# Patient Record
Sex: MALE | Race: WHITE | Employment: OTHER | URBAN - METROPOLITAN AREA
[De-identification: names, ages, dates, MRNs, and addresses within clinical notes are randomized per-mention and may not be internally consistent; named-entity substitution may affect disease eponyms.]

---

## 2017-04-05 ENCOUNTER — HOSPITAL ENCOUNTER (OUTPATIENT)
Dept: GENERAL RADIOLOGY | Age: 77
Discharge: HOME | End: 2017-04-05
Attending: FAMILY MEDICINE
Payer: MEDICARE

## 2017-04-05 DIAGNOSIS — R05.9 COUGH: ICD-10-CM

## 2017-04-05 PROCEDURE — 71020 XR CHEST PA LAT: CPT

## 2019-04-16 ENCOUNTER — HOSPITAL ENCOUNTER (OUTPATIENT)
Dept: GENERAL RADIOLOGY | Age: 79
Discharge: HOME | End: 2019-04-16
Attending: FAMILY MEDICINE
Payer: MEDICARE

## 2019-04-16 DIAGNOSIS — M79.10 MUSCLE ACHE: ICD-10-CM

## 2019-04-16 DIAGNOSIS — R50.9 FEVER, UNSPECIFIED FEVER CAUSE: ICD-10-CM

## 2019-04-16 PROCEDURE — 71046 X-RAY EXAM CHEST 2 VIEWS: CPT

## 2020-09-02 PROBLEM — F32.0 CURRENT MILD EPISODE OF MAJOR DEPRESSIVE DISORDER WITHOUT PRIOR EPISODE (HCC): Status: ACTIVE | Noted: 2020-09-02

## 2020-09-02 PROBLEM — I48.20 CHRONIC ATRIAL FIBRILLATION (HCC): Status: ACTIVE | Noted: 2020-09-02

## 2021-08-10 PROBLEM — D68.69 SECONDARY HYPERCOAGULABLE STATE (HCC): Status: ACTIVE | Noted: 2021-08-10

## 2022-03-18 PROBLEM — I48.20 CHRONIC ATRIAL FIBRILLATION (HCC): Status: ACTIVE | Noted: 2020-09-02

## 2022-03-18 PROBLEM — D68.69 SECONDARY HYPERCOAGULABLE STATE (HCC): Status: ACTIVE | Noted: 2021-08-10

## 2022-03-19 PROBLEM — F32.0 CURRENT MILD EPISODE OF MAJOR DEPRESSIVE DISORDER WITHOUT PRIOR EPISODE (HCC): Status: ACTIVE | Noted: 2020-09-02

## 2022-06-14 ENCOUNTER — HOSPITAL ENCOUNTER (OUTPATIENT)
Dept: GENERAL RADIOLOGY | Age: 82
Discharge: HOME | End: 2022-06-14
Attending: FAMILY MEDICINE
Payer: MEDICARE

## 2022-06-14 DIAGNOSIS — R05.9 COUGH: ICD-10-CM

## 2022-06-14 PROCEDURE — 71046 X-RAY EXAM CHEST 2 VIEWS: CPT

## 2022-06-19 PROBLEM — D69.2 SENILE PURPURA (HCC): Status: ACTIVE | Noted: 2022-06-19

## 2022-07-18 PROBLEM — F32.0 CURRENT MILD EPISODE OF MAJOR DEPRESSIVE DISORDER WITHOUT PRIOR EPISODE (HCC): Status: RESOLVED | Noted: 2020-09-02 | Resolved: 2022-07-18

## 2023-08-24 ENCOUNTER — HOSPITAL ENCOUNTER (EMERGENCY)
Facility: HOSPITAL | Age: 83
Discharge: HOME | End: 2023-08-24
Attending: EMERGENCY MEDICINE

## 2023-08-24 VITALS
BODY MASS INDEX: 25.85 KG/M2 | HEART RATE: 83 BPM | DIASTOLIC BLOOD PRESSURE: 90 MMHG | HEIGHT: 70 IN | TEMPERATURE: 98 F | OXYGEN SATURATION: 95 % | RESPIRATION RATE: 16 BRPM | WEIGHT: 180.56 LBS | SYSTOLIC BLOOD PRESSURE: 175 MMHG

## 2023-08-24 DIAGNOSIS — G51.0 BELL'S PALSY: Primary | ICD-10-CM

## 2023-08-24 LAB
ANION GAP SERPL CALC-SCNC: 4 MMOL/L (ref 5–15)
BUN SERPL-MCNC: 12 MG/DL (ref 6–20)
BUN/CREAT SERPL: 11 (ref 12–20)
CALCIUM SERPL-MCNC: 9 MG/DL (ref 8.5–10.1)
CHLORIDE SERPL-SCNC: 110 MMOL/L (ref 97–108)
CO2 SERPL-SCNC: 28 MMOL/L (ref 21–32)
COMMENT:: NORMAL
CREAT SERPL-MCNC: 1.08 MG/DL (ref 0.7–1.3)
GLUCOSE SERPL-MCNC: 102 MG/DL (ref 65–100)
POTASSIUM SERPL-SCNC: 3.9 MMOL/L (ref 3.5–5.1)
SODIUM SERPL-SCNC: 142 MMOL/L (ref 136–145)
SPECIMEN HOLD: NORMAL

## 2023-08-24 PROCEDURE — 99283 EMERGENCY DEPT VISIT LOW MDM: CPT

## 2023-08-24 PROCEDURE — 80048 BASIC METABOLIC PNL TOTAL CA: CPT

## 2023-08-24 PROCEDURE — 36415 COLL VENOUS BLD VENIPUNCTURE: CPT

## 2023-08-24 RX ORDER — VALACYCLOVIR HYDROCHLORIDE 1 G/1
1000 TABLET, FILM COATED ORAL 3 TIMES DAILY
Qty: 21 TABLET | Refills: 0 | Status: SHIPPED | OUTPATIENT
Start: 2023-08-24 | End: 2023-08-31

## 2023-08-24 RX ORDER — PREDNISONE 10 MG/1
50 TABLET ORAL DAILY
Qty: 35 TABLET | Refills: 0 | Status: SHIPPED | OUTPATIENT
Start: 2023-08-24 | End: 2023-08-31

## 2023-08-24 ASSESSMENT — PAIN - FUNCTIONAL ASSESSMENT: PAIN_FUNCTIONAL_ASSESSMENT: NONE - DENIES PAIN

## 2023-08-24 NOTE — ED NOTES
Discharge instructions reviewed with pt.  Pt verbalized understanding      Ronaldo Valentino RN  08/24/23 1939

## 2023-08-24 NOTE — DISCHARGE INSTRUCTIONS
Return to the ER with any new or worsening symptoms. In the meantime take the medication as directed. I recommend you use artificial tears multiple times per day in your left eye. You should also tape your eye shut at night to prevent it from drying out.   In addition to following up with your primary care doctor I provided you with information to follow-up with a neurologist.

## 2023-08-24 NOTE — ED PROVIDER NOTES
[facility] [place] EMERGENCY [address] ENCOUNTER      Pt Name: Enrrique Millan. MRN: [id number]  Birthdate 1940  Date of evaluation: 8/24/2023  Provider: Geovani Christensen MD      HISTORY OF PRESENT ILLNESS      HPI  81-year-old with a past medical history of hypertension, A-fib on Pradaxa, and CHF presenting to the emergency department due to facial droop. He noticed that at some point last night. He noticed he could not brush his teeth and his eye was watering. He says he cannot smile. He denies any difficulty speaking or numbness in his face. No facial pain, itching, or eye pain. No rash. Nursing Notes were reviewed. REVIEW OF SYSTEMS         Review of Systems  All systems reviewed are negative less otherwise document in the HPI      PAST MEDICAL HISTORY     Past Medical History:   Diagnosis Date    Arrhythmia     atrial fibrillation    Enlarged prostate 10/3/2011    HTN (hypertension) 10/3/2011    Hx of arthroscopy of knee 10/3/2011         SURGICAL HISTORY       Past Surgical History:   Procedure Laterality Date    ORTHOPEDIC SURGERY      arthoscopy of knee         CURRENT MEDICATIONS       Previous Medications    ASPIRIN 81 MG EC TABLET    Take 81 mg by mouth daily    DABIGATRAN (PRADAXA) 150 MG CAPSULE    TAKE 1 CAPSULE EVERY 12    HOURS    DOXAZOSIN (CARDURA) 4 MG TABLET    Take 4 mg by mouth daily    ESCITALOPRAM (LEXAPRO) 10 MG TABLET    Take 1 tablet by mouth daily    FUROSEMIDE (LASIX) 20 MG TABLET    TAKE 1 TABLET BY MOUTH EVERY DAY FOR 2 WEEKS    IRBESARTAN (AVAPRO) 300 MG TABLET    TAKE 1 TABLET BY MOUTH EVERY DAY NIGHTLY    TADALAFIL (CIALIS) 20 MG TABLET    Take 20 mg by mouth as needed       ALLERGIES     Patient has no known allergies.     FAMILY HISTORY       Family History   Problem Relation Age of Onset    Cancer Maternal Grandfather         prostate    Cancer Paternal Grandfather         prostate    Cancer Father         prostate    Heart Disease Father

## 2024-11-14 ENCOUNTER — HOSPITAL ENCOUNTER (OUTPATIENT)
Facility: HOSPITAL | Age: 84
Discharge: HOME | End: 2024-11-17
Payer: COMMERCIAL

## 2024-11-14 DIAGNOSIS — M54.42 LEFT-SIDED LOW BACK PAIN WITH LEFT-SIDED SCIATICA, UNSPECIFIED CHRONICITY: ICD-10-CM

## 2024-11-14 PROCEDURE — 72100 X-RAY EXAM L-S SPINE 2/3 VWS: CPT
